# Patient Record
Sex: MALE | Race: WHITE | NOT HISPANIC OR LATINO
[De-identification: names, ages, dates, MRNs, and addresses within clinical notes are randomized per-mention and may not be internally consistent; named-entity substitution may affect disease eponyms.]

---
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
## 2022-06-22 ENCOUNTER — APPOINTMENT (OUTPATIENT)
Dept: ORTHOPEDIC SURGERY | Facility: CLINIC | Age: 22
End: 2022-06-22
Payer: COMMERCIAL

## 2022-06-22 PROCEDURE — 99204 OFFICE O/P NEW MOD 45 MIN: CPT

## 2022-06-22 PROCEDURE — 99203 OFFICE O/P NEW LOW 30 MIN: CPT | Mod: 25

## 2022-06-22 PROCEDURE — 73030 X-RAY EXAM OF SHOULDER: CPT | Mod: LT

## 2022-06-22 RX ORDER — AZITHROMYCIN 250 MG/1
250 TABLET, FILM COATED ORAL
Qty: 12 | Refills: 0 | Status: DISCONTINUED | COMMUNITY
Start: 2022-05-31

## 2022-06-22 NOTE — HISTORY OF PRESENT ILLNESS
[de-identified] : Patient is here today for the left shoulder, about 10 days ago he lost strength in it. now has a lot of pain with adl. Pain is anterolateral. rhd. no prior issues with it. no unusual activity. applying biofreeze. no waking. cc is weakness. no paresthesias. not taking any meds for it. has been doing hep for it which isnt helping

## 2022-06-22 NOTE — DISCUSSION/SUMMARY
[de-identified] : Options were discussed today including oral anti-inflammatories, Physical Therapy, Steroid Injection,hylagan injections or Surgery. The patient had time to ask questions of the different treatment options, including doing nothing and just observing for a finite period of time and re-evaluating in the future \par \par Patient will follow up after MRI of left shoulder.

## 2022-06-22 NOTE — PHYSICAL EXAM
[4 ___] : forward flexion 4[unfilled]/5 [4___] : external rotation 4[unfilled]/5 [5___] : internal rotation 5[unfilled]/5 [Left] : left shoulder [There are no fractures, subluxations or dislocations. No significant abnormalities are seen] : There are no fractures, subluxations or dislocations. No significant abnormalities are seen [] : no erythema

## 2022-06-27 ENCOUNTER — FORM ENCOUNTER (OUTPATIENT)
Age: 22
End: 2022-06-27

## 2022-06-28 ENCOUNTER — APPOINTMENT (OUTPATIENT)
Dept: MRI IMAGING | Facility: CLINIC | Age: 22
End: 2022-06-28

## 2022-06-28 PROCEDURE — 73221 MRI JOINT UPR EXTREM W/O DYE: CPT | Mod: LT

## 2022-07-11 ENCOUNTER — APPOINTMENT (OUTPATIENT)
Dept: ORTHOPEDIC SURGERY | Facility: CLINIC | Age: 22
End: 2022-07-11

## 2022-07-11 PROCEDURE — 20610 DRAIN/INJ JOINT/BURSA W/O US: CPT

## 2022-07-11 PROCEDURE — 99213 OFFICE O/P EST LOW 20 MIN: CPT | Mod: 25

## 2022-07-11 PROCEDURE — 99214 OFFICE O/P EST MOD 30 MIN: CPT | Mod: 25

## 2022-07-11 NOTE — HISTORY OF PRESENT ILLNESS
[de-identified] : following up for the left shoulder. Patient is here today for MRI results. Patient notes pain anterior but especially weakness.

## 2022-07-11 NOTE — PROCEDURE
[Left] : of the left [Subacromial Space] : subacromial space [Pain] : pain [Inflammation] : inflammation [Betadine] : betadine [Sterile technique used] : sterile technique used [___ cc    6mg] :  Betamethasone (Celestone) ~Vcc of 6mg [___ cc    1%] : Lidocaine ~Vcc of 1%  [] : Patient tolerated procedure well [Call if redness, pain or fever occur] : call if redness, pain or fever occur [Apply ice for 15min out of every hour for the next 12-24 hours as tolerated] : apply ice for 15 minutes out of every hour for the next 12-24 hours as tolerated [Previous OTC use and PT nontherapeutic] : patient has tried OTC's including aspirin, Ibuprofen, Aleve, etc or prescription NSAIDS, and/or exercises at home and/or physical therapy without satisfactory response [Patient had decreased mobility in the joint] : patient had decreased mobility in the joint [Risks, benefits, alternatives discussed / Verbal consent obtained] : the risks benefits, and alternatives have been discussed, and verbal consent was obtained

## 2022-07-11 NOTE — REASON FOR VISIT
[FreeTextEntry2] : MRI impressions L shoulder: 1. Capsular thickening, which can be seen with adhesive capsulitis.\par 2. Biceps tenosynovitis.\par 3. Glenohumeral joint effusion.

## 2022-07-22 ENCOUNTER — OUTPATIENT (OUTPATIENT)
Dept: OUTPATIENT SERVICES | Facility: HOSPITAL | Age: 22
LOS: 1 days | End: 2022-07-22

## 2022-07-22 DIAGNOSIS — Z20.828 CONTACT WITH AND (SUSPECTED) EXPOSURE TO OTHER VIRAL COMMUNICABLE DISEASES: ICD-10-CM

## 2022-08-15 ENCOUNTER — APPOINTMENT (OUTPATIENT)
Dept: ORTHOPEDIC SURGERY | Facility: CLINIC | Age: 22
End: 2022-08-15

## 2022-09-12 ENCOUNTER — APPOINTMENT (OUTPATIENT)
Dept: ORTHOPEDIC SURGERY | Facility: CLINIC | Age: 22
End: 2022-09-12

## 2022-09-12 DIAGNOSIS — M75.42 IMPINGEMENT SYNDROME OF LEFT SHOULDER: ICD-10-CM

## 2022-09-12 PROBLEM — Z00.00 ENCOUNTER FOR PREVENTIVE HEALTH EXAMINATION: Status: ACTIVE | Noted: 2022-09-12

## 2022-09-12 PROCEDURE — 99213 OFFICE O/P EST LOW 20 MIN: CPT

## 2022-09-12 NOTE — DISCUSSION/SUMMARY
[de-identified] : He will continue PT no restrictions. f/u in 2 mos. \par \par not painful anymore

## 2022-09-12 NOTE — PHYSICAL EXAM
[Left] : left shoulder [NL (0-180)] : full active abduction 0-180 degrees [NL (0-90)] : full external rotation 0-90 degrees [5 ___] : forward flexion 5[unfilled]/5 [5___] : internal rotation 5[unfilled]/5 [] : negative Miguel [TWNoteComboBox6] : internal rotation 50 degrees

## 2022-09-12 NOTE — HISTORY OF PRESENT ILLNESS
[de-identified] : following up for the left shoulder. Patient had a CSI 7/11/2022 which provided good relief. He states he is also currently doing PT.  He state myah is much improved. His strength has imrproved as well.  Denies any neck pain.

## 2023-04-17 ENCOUNTER — APPOINTMENT (OUTPATIENT)
Dept: ORTHOPEDIC SURGERY | Facility: CLINIC | Age: 23
End: 2023-04-17
Payer: COMMERCIAL

## 2023-04-17 DIAGNOSIS — S43.422A SPRAIN OF LEFT ROTATOR CUFF CAPSULE, INITIAL ENCOUNTER: ICD-10-CM

## 2023-04-17 PROCEDURE — 99213 OFFICE O/P EST LOW 20 MIN: CPT | Mod: 25

## 2023-04-17 PROCEDURE — 20610 DRAIN/INJ JOINT/BURSA W/O US: CPT | Mod: LT

## 2023-04-17 NOTE — PROCEDURE
[Large Joint Injection] : Large joint injection [Left] : of the left [Subacromial Space] : subacromial space [Pain] : pain [Betadine] : betadine [Ethyl Chloride sprayed topically] : ethyl chloride sprayed topically [Sterile technique used] : sterile technique used [___ cc    6mg] :  Betamethasone (Celestone) ~Vcc of 6mg [___ cc    1%] : Lidocaine ~Vcc of 1%  [] : Patient tolerated procedure well [Call if redness, pain or fever occur] : call if redness, pain or fever occur [Apply ice for 15min out of every hour for the next 12-24 hours as tolerated] : apply ice for 15 minutes out of every hour for the next 12-24 hours as tolerated [Previous OTC use and PT nontherapeutic] : patient has tried OTC's including aspirin, Ibuprofen, Aleve, etc or prescription NSAIDS, and/or exercises at home and/or physical therapy without satisfactory response [Patient had decreased mobility in the joint] : patient had decreased mobility in the joint [Risks, benefits, alternatives discussed / Verbal consent obtained] : the risks benefits, and alternatives have been discussed, and verbal consent was obtained

## 2023-04-17 NOTE — DISCUSSION/SUMMARY
[de-identified] : Options were discussed today including oral anti-inflammatories, Physical Therapy, Steroid Injection, or Surgery. The patient had time to ask questions of the different treatment options, including doing nothing and just observing for a finite period of time and re-evaluating in the future. \par Patient wants CSI. Patient received CSI in subacromial space of left shoulder in office today. pt tolerated procedure well and was advised to ice if sore. \par f/u one month \par \par Entered by Ashly Lanier acting as scribe. \par Dr. Collins Attestation\par The documentation recorded by the scribe, in my presence, accurately reflects the service I, Dr. Collins, personally performed, and the decisions made by me with my edits as appropriate.\par

## 2023-04-17 NOTE — HISTORY OF PRESENT ILLNESS
[de-identified] : following up for the left shoulder. States he went to PT and felt some improvement. States he has started doing more labor and the original pain has returned. States he feels weakness. States he is unable to sleep on his left side. States he has been icing. Admits to taking Advil PRN for pain.\par \par Patient had CSI and PT last visit which he states gave him good relief.\par He wants to try another CSI

## 2023-04-17 NOTE — PHYSICAL EXAM
[Left] : left shoulder [There are no fractures, subluxations or dislocations. No significant abnormalities are seen] : There are no fractures, subluxations or dislocations. No significant abnormalities are seen [NL (0-180)] : full active abduction 0-180 degrees [NL (0-70)] : full internal rotation 0-70 degrees [NL (0-90)] : full external rotation 0-90 degrees [5 ___] : forward flexion 5[unfilled]/5 [5___] : external rotation 5[unfilled]/5 [] : no erythema
